# Patient Record
Sex: FEMALE | Race: WHITE | Employment: FULL TIME | URBAN - METROPOLITAN AREA
[De-identification: names, ages, dates, MRNs, and addresses within clinical notes are randomized per-mention and may not be internally consistent; named-entity substitution may affect disease eponyms.]

---

## 2018-02-19 ENCOUNTER — HOSPITAL ENCOUNTER (OUTPATIENT)
Dept: WOMENS IMAGING | Age: 43
Discharge: HOME | End: 2018-02-21
Payer: COMMERCIAL

## 2018-02-19 DIAGNOSIS — Z01.419 ENCOUNTER FOR ANNUAL ROUTINE GYNECOLOGICAL EXAMINATION: ICD-10-CM

## 2018-02-19 PROCEDURE — 77063 BREAST TOMOSYNTHESIS BI: CPT

## 2019-06-20 ENCOUNTER — HOSPITAL ENCOUNTER (OUTPATIENT)
Dept: WOMENS IMAGING | Age: 44
Discharge: HOME | End: 2019-06-22

## 2019-06-20 DIAGNOSIS — Z12.31 ENCOUNTER FOR SCREENING MAMMOGRAM FOR BREAST CANCER: ICD-10-CM

## 2019-06-20 PROCEDURE — 77067 SCR MAMMO BI INCL CAD: CPT

## 2020-08-12 ENCOUNTER — HOSPITAL ENCOUNTER (OUTPATIENT)
Dept: WOMENS IMAGING | Age: 45
Discharge: HOME | End: 2020-08-14
Payer: COMMERCIAL

## 2020-08-12 PROCEDURE — 77063 BREAST TOMOSYNTHESIS BI: CPT

## 2021-10-27 ENCOUNTER — HOSPITAL ENCOUNTER (OUTPATIENT)
Dept: WOMENS IMAGING | Age: 46
Discharge: HOME | End: 2021-10-29
Payer: COMMERCIAL

## 2021-10-27 DIAGNOSIS — Z12.31 ENCOUNTER FOR SCREENING MAMMOGRAM FOR BREAST CANCER: ICD-10-CM

## 2021-10-27 PROCEDURE — 77063 BREAST TOMOSYNTHESIS BI: CPT

## 2022-12-14 ENCOUNTER — HOSPITAL ENCOUNTER (OUTPATIENT)
Dept: WOMENS IMAGING | Age: 47
Discharge: HOME | End: 2022-12-16
Payer: COMMERCIAL

## 2022-12-14 DIAGNOSIS — Z12.31 ENCOUNTER FOR SCREENING MAMMOGRAM FOR MALIGNANT NEOPLASM OF BREAST: ICD-10-CM

## 2022-12-14 DIAGNOSIS — R92.2 BREAST DENSITY: ICD-10-CM

## 2022-12-14 PROCEDURE — 77063 BREAST TOMOSYNTHESIS BI: CPT

## 2023-01-16 PROBLEM — R79.89 LOW VITAMIN D LEVEL: Status: ACTIVE | Noted: 2023-01-16

## 2023-01-16 PROBLEM — M22.41 CHONDROMALACIA PATELLAE, RIGHT KNEE: Status: ACTIVE | Noted: 2022-05-19

## 2023-01-16 PROBLEM — R73.01 IMPAIRED FASTING GLUCOSE: Status: ACTIVE | Noted: 2023-01-16

## 2023-01-16 PROBLEM — E78.5 HYPERLIPIDEMIA: Status: ACTIVE | Noted: 2023-01-16

## 2023-01-17 ENCOUNTER — OFFICE VISIT (OUTPATIENT)
Dept: OBGYN CLINIC | Age: 48
End: 2023-01-17
Payer: COMMERCIAL

## 2023-01-17 VITALS
DIASTOLIC BLOOD PRESSURE: 82 MMHG | WEIGHT: 170 LBS | BODY MASS INDEX: 33.38 KG/M2 | HEIGHT: 60 IN | SYSTOLIC BLOOD PRESSURE: 118 MMHG

## 2023-01-17 DIAGNOSIS — Z01.419 ENCOUNTER FOR WELL WOMAN EXAM WITH ROUTINE GYNECOLOGICAL EXAM: Primary | ICD-10-CM

## 2023-01-17 DIAGNOSIS — Z12.11 COLON CANCER SCREENING: ICD-10-CM

## 2023-01-17 DIAGNOSIS — R10.9 ABDOMINAL WALL PAIN: ICD-10-CM

## 2023-01-17 PROCEDURE — 99396 PREV VISIT EST AGE 40-64: CPT | Performed by: OBSTETRICS & GYNECOLOGY

## 2023-01-17 RX ORDER — MULTIVIT-MIN/IRON/FOLIC ACID/K 18-600-40
CAPSULE ORAL
COMMUNITY

## 2023-01-17 ASSESSMENT — ENCOUNTER SYMPTOMS
APNEA: 0
BLOOD IN STOOL: 0
NAUSEA: 0
CONSTIPATION: 0
SHORTNESS OF BREATH: 0
DIARRHEA: 0
ABDOMINAL PAIN: 1

## 2023-01-17 NOTE — PROGRESS NOTES
Subjective:      Patient ID:  Stanislaw Cruz is a 50 y.o. female with chief complaint of:  Chief Complaint   Patient presents with    Annual Exam     Pt also having pelvic pain that starts in the middle and feels somewhat muscular. Pt had hyst in 2016. Pt states this pelvic pain has gotten worse over the last 8 months        Patient is s/p hysterectomy however presents from annual she still has ovaries. She is complaining of lower abdominal pain the was worsening since spring. She states it is aggravating at 4-5/10 with occasional worsening. She notices mostly when she stands sits up from lying but also after she has been lying down for hours. It runs across entire abdomen does not radiate down or to back. She denies any pain with intercourse denies ovulation pain. No past medical history on file. Past Surgical History:   Procedure Laterality Date    HYSTERECTOMY VAGINAL N/A 10/3/2016    [facility]  performed by Perfecto Yo DO at [address] History   Problem Relation Age of Onset    Cancer Paternal Aunt      Current Outpatient Medications on File Prior to Visit   Medication Sig Dispense Refill    Cholecalciferol (VITAMIN D) 50 MCG (2000 UT) CAPS capsule Take by mouth      escitalopram (LEXAPRO) 20 MG tablet Take by mouth      buPROPion (WELLBUTRIN XL) 150 MG extended release tablet  (Patient not taking: Reported on 1/17/2023)      ibuprofen (ADVIL;MOTRIN) 800 MG tablet  (Patient not taking: Reported on 1/17/2023)       No current facility-administered medications on file prior to visit. Allergies:  Patient has no known allergies. Review of Systems   Constitutional:  Positive for unexpected weight change. Negative for fatigue and fever. Respiratory:  Negative for apnea and shortness of breath. Cardiovascular:  Negative for chest pain and palpitations. Gastrointestinal:  Positive for abdominal pain. Negative for blood in stool, constipation, diarrhea and nausea.    Genitourinary: Negative for difficulty urinating, dyspareunia, dysuria, flank pain, hematuria, vaginal bleeding and vaginal discharge. Neurological:  Negative for dizziness, weakness and light-headedness. Psychiatric/Behavioral:  Negative for agitation and dysphoric mood. Objective:   /82   Ht 5' (1.524 m)   Wt 170 lb (77.1 kg)   LMP 09/29/2016 (Exact Date)   BMI 33.20 kg/m²      Physical Exam  Constitutional:       General: She is not in acute distress. Appearance: She is well-developed. She is not diaphoretic. HENT:      Head: Normocephalic. Right Ear: External ear normal.      Left Ear: External ear normal.      Nose: Nose normal.   Eyes:      Conjunctiva/sclera: Conjunctivae normal.      Pupils: Pupils are equal, round, and reactive to light. Neck:      Thyroid: No thyromegaly. Trachea: No tracheal deviation. Cardiovascular:      Rate and Rhythm: Normal rate and regular rhythm. Heart sounds: Normal heart sounds. No murmur heard. No friction rub. No gallop. Pulmonary:      Effort: Pulmonary effort is normal. No respiratory distress. Breath sounds: Normal breath sounds. No wheezing or rales. Chest:      Chest wall: No tenderness. Breasts:     Right: No mass, nipple discharge, skin change or tenderness. Left: No mass, nipple discharge, skin change or tenderness. Abdominal:      General: Bowel sounds are normal. There is no distension. Palpations: Abdomen is soft. There is no mass. Tenderness: There is abdominal tenderness (in particularly midline but running across area of patient's pannus. ). There is no guarding or rebound. Hernia: No hernia is present. Genitourinary:     Labia:         Right: No rash, tenderness or lesion. Left: No rash, tenderness or lesion. Vagina: Normal.      Adnexa:         Right: No mass, tenderness or fullness. Left: No mass, tenderness or fullness.         Comments: Vagina; cuff well supported  Lymphadenopathy:      Upper Body:      Right upper body: No supraclavicular or axillary adenopathy. Left upper body: No supraclavicular or axillary adenopathy. Skin:     General: Skin is warm and dry. Neurological:      Mental Status: She is alert and oriented to person, place, and time. Cranial Nerves: No cranial nerve deficit. Deep Tendon Reflexes: Reflexes normal.   Psychiatric:         Mood and Affect: Mood normal.         Behavior: Behavior normal.         Judgment: Judgment normal.       Assessment:       Diagnosis Orders   1. Encounter for well woman exam with routine gynecological exam        2. Abdominal wall pain  CT ABDOMEN PELVIS W WO CONTRAST Additional Contrast? Oral      3. Colon cancer screening  Tana Moore MD, Gastroenterology, [facility]            Plan:     Patient with pelvic US in office by dr Ronna Faith in 2018 for similar pain. No concern  noted. There feels like a palpable diathesis rectus but no hernia of fascia. Will have pt use abdominal support with faja to see if this supports help. Will obtain CT to rule out any other changes   Orders Placed This Encounter   Procedures    CT ABDOMEN PELVIS W WO CONTRAST Additional Contrast? Oral     Standing Status:   Future     Standing Expiration Date:   1/17/2024     Order Specific Question:   Additional Contrast?     Answer:   Oral     Order Specific Question:   STAT Creatinine as needed:     Answer:   Yes    Tana Moore MD, [facility]     Referral Priority:   Routine     Referral Type:   Eval and Treat     Referral Reason:   Specialty Services Required     Referred to Provider:   Cesar Solis MD     Requested Specialty:   Gastroenterology     Number of Visits Requested:   1     No orders of the defined types were placed in this encounter. No follow-ups on file.      Avinash Zhang DO

## 2023-01-19 ENCOUNTER — TELEPHONE (OUTPATIENT)
Dept: OBGYN CLINIC | Age: 48
End: 2023-01-19

## 2023-01-19 NOTE — TELEPHONE ENCOUNTER
Pt was given a CT scan order with oral contrast.  Pt cannot do the oral contrast and asking if order can be changed to IV contrast.

## 2023-02-15 ENCOUNTER — HOSPITAL ENCOUNTER (OUTPATIENT)
Dept: CT IMAGING | Age: 48
Discharge: HOME | End: 2023-02-17
Payer: COMMERCIAL

## 2023-02-15 DIAGNOSIS — R10.9 ABDOMINAL WALL PAIN: ICD-10-CM

## 2023-02-15 PROCEDURE — 6360000004 HC RX CONTRAST MEDICATION: Performed by: OBSTETRICS & GYNECOLOGY

## 2023-02-15 PROCEDURE — 74177 CT ABD & PELVIS W/CONTRAST: CPT

## 2023-02-15 RX ADMIN — IOPAMIDOL 50 ML: 612 INJECTION, SOLUTION INTRAVENOUS at 14:00

## 2023-04-07 ENCOUNTER — HOSPITAL ENCOUNTER (OUTPATIENT)
Age: 48
Setting detail: OUTPATIENT SURGERY
Discharge: HOME | End: 2023-04-07
Attending: INTERNAL MEDICINE | Admitting: INTERNAL MEDICINE
Payer: COMMERCIAL

## 2023-04-07 ENCOUNTER — ANESTHESIA (OUTPATIENT)
Dept: ENDOSCOPY | Age: 48
End: 2023-04-07
Payer: COMMERCIAL

## 2023-04-07 ENCOUNTER — ANESTHESIA EVENT (OUTPATIENT)
Dept: ENDOSCOPY | Age: 48
End: 2023-04-07
Payer: COMMERCIAL

## 2023-04-07 VITALS
HEIGHT: 60 IN | RESPIRATION RATE: 16 BRPM | BODY MASS INDEX: 33.38 KG/M2 | HEART RATE: 63 BPM | OXYGEN SATURATION: 97 % | TEMPERATURE: 97.9 F | WEIGHT: 170 LBS | SYSTOLIC BLOOD PRESSURE: 141 MMHG | DIASTOLIC BLOOD PRESSURE: 79 MMHG

## 2023-04-07 DIAGNOSIS — Z12.11 SCREENING FOR COLON CANCER: ICD-10-CM

## 2023-04-07 PROCEDURE — 6370000000 HC RX 637 (ALT 250 FOR IP): Performed by: INTERNAL MEDICINE

## 2023-04-07 PROCEDURE — 2580000003 HC RX 258: Performed by: INTERNAL MEDICINE

## 2023-04-07 PROCEDURE — 7100000010 HC PHASE II RECOVERY - FIRST 15 MIN: Performed by: INTERNAL MEDICINE

## 2023-04-07 PROCEDURE — 2580000003 HC RX 258

## 2023-04-07 PROCEDURE — 6360000002 HC RX W HCPCS: Performed by: NURSE ANESTHETIST, CERTIFIED REGISTERED

## 2023-04-07 PROCEDURE — 3700000001 HC ADD 15 MINUTES (ANESTHESIA): Performed by: INTERNAL MEDICINE

## 2023-04-07 PROCEDURE — 2500000003 HC RX 250 WO HCPCS: Performed by: NURSE ANESTHETIST, CERTIFIED REGISTERED

## 2023-04-07 PROCEDURE — 3609027000 HC COLONOSCOPY: Performed by: INTERNAL MEDICINE

## 2023-04-07 PROCEDURE — 3700000000 HC ANESTHESIA ATTENDED CARE: Performed by: INTERNAL MEDICINE

## 2023-04-07 PROCEDURE — 7100000011 HC PHASE II RECOVERY - ADDTL 15 MIN: Performed by: INTERNAL MEDICINE

## 2023-04-07 PROCEDURE — 2709999900 HC NON-CHARGEABLE SUPPLY: Performed by: INTERNAL MEDICINE

## 2023-04-07 RX ORDER — SODIUM CHLORIDE 9 MG/ML
INJECTION, SOLUTION INTRAVENOUS CONTINUOUS
Status: DISCONTINUED | OUTPATIENT
Start: 2023-04-07 | End: 2023-04-07 | Stop reason: HOSPADM

## 2023-04-07 RX ORDER — SODIUM CHLORIDE 9 MG/ML
INJECTION, SOLUTION INTRAVENOUS
Status: COMPLETED
Start: 2023-04-07 | End: 2023-04-07

## 2023-04-07 RX ORDER — ASCORBIC ACID 500 MG
500 TABLET ORAL DAILY
COMMUNITY

## 2023-04-07 RX ORDER — LIDOCAINE HYDROCHLORIDE 20 MG/ML
INJECTION, SOLUTION INFILTRATION; PERINEURAL PRN
Status: DISCONTINUED | OUTPATIENT
Start: 2023-04-07 | End: 2023-04-07 | Stop reason: SDUPTHER

## 2023-04-07 RX ORDER — SIMETHICONE 20 MG/.3ML
EMULSION ORAL PRN
Status: DISCONTINUED | OUTPATIENT
Start: 2023-04-07 | End: 2023-04-07 | Stop reason: ALTCHOICE

## 2023-04-07 RX ORDER — MAGNESIUM HYDROXIDE 1200 MG/15ML
LIQUID ORAL PRN
Status: DISCONTINUED | OUTPATIENT
Start: 2023-04-07 | End: 2023-04-07 | Stop reason: ALTCHOICE

## 2023-04-07 RX ORDER — PROPOFOL 10 MG/ML
INJECTION, EMULSION INTRAVENOUS PRN
Status: DISCONTINUED | OUTPATIENT
Start: 2023-04-07 | End: 2023-04-07 | Stop reason: SDUPTHER

## 2023-04-07 RX ADMIN — SODIUM CHLORIDE 500 ML: 9 INJECTION, SOLUTION INTRAVENOUS at 08:00

## 2023-04-07 RX ADMIN — PROPOFOL 50 MG: 10 INJECTION, EMULSION INTRAVENOUS at 08:29

## 2023-04-07 RX ADMIN — LIDOCAINE HYDROCHLORIDE 40 MG: 20 INJECTION, SOLUTION INFILTRATION; PERINEURAL at 08:19

## 2023-04-07 RX ADMIN — PROPOFOL 50 MG: 10 INJECTION, EMULSION INTRAVENOUS at 08:22

## 2023-04-07 RX ADMIN — PROPOFOL 100 MG: 10 INJECTION, EMULSION INTRAVENOUS at 08:20

## 2023-04-07 ASSESSMENT — PAIN DESCRIPTION - LOCATION: LOCATION: ABDOMEN

## 2023-04-07 ASSESSMENT — PAIN - FUNCTIONAL ASSESSMENT: PAIN_FUNCTIONAL_ASSESSMENT: NONE - DENIES PAIN

## 2023-04-07 ASSESSMENT — PAIN SCALES - GENERAL: PAINLEVEL_OUTOF10: 1

## 2023-04-07 NOTE — H&P
Patient Name: Christy Yoo  : 1975  MRN: [id number]  DATE: 23      ENDOSCOPY  History and Physical    Procedure:    [] Diagnostic Colonoscopy       [x] Screening Colonoscopy  [] EGD      [] ERCP      [] EUS       [] Other    [x] Previous office notes/History and Physical reviewed from the patients chart. Please see EMR for further details of HPI. I have examined the patient's status immediately prior to the procedure and:      Indications/HPI:    []Abdominal Pain   []Cancer- GI/Lung  []Fhx of colon CA  []History of Polyps   []Hodges   []Melena  []Abnormal Imaging   []Dysphagia    []Persistent Pneumonia  []Anemia   []Food Impaction  []History of Polyps  []GI Bleed   []Pulmonary nodule/Mass  []Change in bowel habits  []Heartburn/Reflux  []Rectal Bleed (BRBPR)  []Chest Pain - Non Cardiac  []Heme (+) Stool  []Ulcers  []Constipation   []Hemoptysis   []Varices  []Diarrhea   []Hypoxemia  []Nausea/Vomiting   [x]Screening   []Crohns/Colitis  []Other:    Anesthesia:   [x] MAC [] Moderate Sedation   [] General   [] None     ROS: 12 pt Review of Symptoms was negative unless mentioned above    Medications:   Prior to Admission medications    Medication Sig Start Date End Date Taking? Authorizing Provider   vitamin C (ASCORBIC ACID) 500 MG tablet Take 1 tablet by mouth daily   Yes Historical Provider, MD   Cholecalciferol (VITAMIN D) 50 MCG (2000) CAPS capsule Take by mouth    Historical Provider, MD   escitalopram (LEXAPRO) 20 MG tablet Take by mouth 19   Historical Provider, MD   buPROPion (WELLBUTRIN XL) 150 MG extended release tablet  18   Historical Provider, MD   ibuprofen (ADVIL;MOTRIN) 800 MG tablet  18   Historical Provider, MD     Allergies: No Known Allergies   History of allergic reaction to anesthesia:  No  Past Medical History:  History reviewed. No pertinent past medical history.   Past Surgical History:  Past Surgical History:   Procedure Laterality Date    HYSTERECTOMY VAGINAL N/A

## 2023-04-07 NOTE — ANESTHESIA POSTPROCEDURE EVALUATION
Department of Anesthesiology  Postprocedure Note    Patient: Sean Lynn  MRN: [id number]  YOB: 1975  Date of evaluation: 4/7/2023      Procedure Summary     Date: 04/07/23 Room / Location: [address] [place] [address]    Anesthesia Start: 2477 Anesthesia Stop: 3069    Procedure: Colonoscopy w/ polypectomies Diagnosis:       Screening for colon cancer      (Screening for colon cancer [Z12.11])    Surgeons: Toyin Henson MD Responsible Provider: CIERA Dyson CRNA    Anesthesia Type: MAC ASA Status: 2          Anesthesia Type: No value filed.     Cornelio Phase I: Cornelio Score: 10    Cornelio Phase II:        Anesthesia Post Evaluation    Patient location during evaluation: bedside  Patient participation: complete - patient participated  Level of consciousness: awake  Airway patency: patent  Nausea & Vomiting: no nausea and no vomiting  Complications: no  Cardiovascular status: blood pressure returned to baseline  Respiratory status: acceptable  Hydration status: euvolemic

## 2023-04-07 NOTE — ANESTHESIA PRE PROCEDURE
Answered      Vital Signs (Current): There were no vitals filed for this visit. BP Readings from Last 3 Encounters:   01/17/23 118/82   01/26/18 122/80   10/03/16 120/65       NPO Status:                                                                                 BMI:   Wt Readings from Last 3 Encounters:   01/17/23 170 lb (77.1 kg)   01/26/18 151 lb (68.5 kg)   10/03/16 140 lb 6.9 oz (63.7 kg)     There is no height or weight on file to calculate BMI.    CBC:   Lab Results   Component Value Date/Time    WBC 14.7 10/03/2016 11:39 AM    RBC 4.32 10/03/2016 11:39 AM    HGB 11.9 10/03/2016 11:39 AM    HCT 34.7 10/03/2016 11:39 AM    MCV 80.4 10/03/2016 11:39 AM    RDW 13.7 10/03/2016 11:39 AM     10/03/2016 11:39 AM       CMP: No results found for: NA, K, CL, CO2, BUN, CREATININE, GFRAA, AGRATIO, LABGLOM, GLUCOSE, GLU, PROT, CALCIUM, BILITOT, ALKPHOS, AST, ALT    POC Tests: No results for input(s): POCGLU, POCNA, POCK, POCCL, POCBUN, POCHEMO, POCHCT in the last 72 hours.     Coags:   Lab Results   Component Value Date/Time    APTT 24.7 10/03/2016 11:39 AM       HCG (If Applicable): No results found for: PREGTESTUR, PREGSERUM, HCG, HCGQUANT     ABGs: No results found for: PHART, PO2ART, LKJ8HMP, XRB0TCA, BEART, V8OQRSLS     Type & Screen (If Applicable):  No results found for: LABABO, LABRH    Drug/Infectious Status (If Applicable):  No results found for: HIV, HEPCAB    COVID-19 Screening (If Applicable): No results found for: COVID19        Anesthesia Evaluation  Patient summary reviewed and Nursing notes reviewed  Airway: Mallampati: II  TM distance: >3 FB   Neck ROM: full  Mouth opening: > = 3 FB   Dental:          Pulmonary:Negative Pulmonary ROS and normal exam                               Cardiovascular:Negative CV ROS                      Neuro/Psych:   Negative Neuro/Psych ROS              GI/Hepatic/Renal:   (+) bowel prep,           Endo/Other: Negative

## 2023-05-04 PROBLEM — L30.9 DERMATITIS: Status: ACTIVE | Noted: 2023-05-04

## 2023-05-04 PROBLEM — R92.2 DENSE BREAST: Status: ACTIVE | Noted: 2023-05-04

## 2023-05-04 PROBLEM — E55.9 VITAMIN D DEFICIENCY: Status: ACTIVE | Noted: 2023-05-04

## 2023-05-04 PROBLEM — R05.3 CHRONIC COUGH: Status: ACTIVE | Noted: 2023-05-04

## 2023-05-04 PROBLEM — E78.6 LOW HDL (UNDER 40): Status: ACTIVE | Noted: 2023-05-04

## 2023-05-04 PROBLEM — F32.A DEPRESSION: Status: ACTIVE | Noted: 2023-05-04

## 2023-05-04 PROBLEM — R73.01 IFG (IMPAIRED FASTING GLUCOSE): Status: ACTIVE | Noted: 2023-05-04

## 2023-05-04 PROBLEM — M25.561 PAIN IN BOTH KNEES: Status: ACTIVE | Noted: 2023-05-04

## 2023-05-04 PROBLEM — R03.0 BLOOD PRESSURE ELEVATED WITHOUT HISTORY OF HTN: Status: ACTIVE | Noted: 2023-05-04

## 2023-05-04 PROBLEM — E78.5 HYPERLIPIDEMIA: Status: ACTIVE | Noted: 2023-05-04

## 2023-05-04 PROBLEM — R92.30 DENSE BREAST: Status: ACTIVE | Noted: 2023-05-04

## 2023-05-04 PROBLEM — M25.562 PAIN IN BOTH KNEES: Status: ACTIVE | Noted: 2023-05-04

## 2023-05-09 LAB
ANION GAP IN SER/PLAS: 13 MMOL/L (ref 10–20)
CALCIDIOL (25 OH VITAMIN D3) (NG/ML) IN SER/PLAS: 61 NG/ML
CALCIUM (MG/DL) IN SER/PLAS: 8.9 MG/DL (ref 8.6–10.3)
CARBON DIOXIDE, TOTAL (MMOL/L) IN SER/PLAS: 25 MMOL/L (ref 21–32)
CHLORIDE (MMOL/L) IN SER/PLAS: 106 MMOL/L (ref 98–107)
CHOLESTEROL (MG/DL) IN SER/PLAS: 214 MG/DL (ref 0–199)
CHOLESTEROL IN HDL (MG/DL) IN SER/PLAS: 32.9 MG/DL
CHOLESTEROL/HDL RATIO: 6.5
CREATININE (MG/DL) IN SER/PLAS: 0.9 MG/DL (ref 0.5–1.05)
GFR FEMALE: 78 ML/MIN/1.73M2
GLUCOSE (MG/DL) IN SER/PLAS: 87 MG/DL (ref 74–99)
LDL: 123 MG/DL (ref 0–99)
NON HDL CHOLESTEROL: 181 MG/DL
POTASSIUM (MMOL/L) IN SER/PLAS: 4.6 MMOL/L (ref 3.5–5.3)
SODIUM (MMOL/L) IN SER/PLAS: 139 MMOL/L (ref 136–145)
TRIGLYCERIDE (MG/DL) IN SER/PLAS: 289 MG/DL (ref 0–149)
UREA NITROGEN (MG/DL) IN SER/PLAS: 8 MG/DL (ref 6–23)
VLDL: 58 MG/DL (ref 0–40)

## 2023-05-10 LAB
ESTIMATED AVERAGE GLUCOSE FOR HBA1C: 111 MG/DL
HEMOGLOBIN A1C/HEMOGLOBIN TOTAL IN BLOOD: 5.5 %

## 2023-05-11 ENCOUNTER — OFFICE VISIT (OUTPATIENT)
Dept: PRIMARY CARE | Facility: CLINIC | Age: 48
End: 2023-05-11
Payer: COMMERCIAL

## 2023-05-11 VITALS
HEIGHT: 60 IN | SYSTOLIC BLOOD PRESSURE: 138 MMHG | WEIGHT: 163.7 LBS | TEMPERATURE: 97.9 F | OXYGEN SATURATION: 97 % | HEART RATE: 51 BPM | BODY MASS INDEX: 32.14 KG/M2 | DIASTOLIC BLOOD PRESSURE: 82 MMHG

## 2023-05-11 DIAGNOSIS — F33.9 RECURRENT MAJOR DEPRESSIVE DISORDER, REMISSION STATUS UNSPECIFIED (CMS-HCC): Primary | ICD-10-CM

## 2023-05-11 DIAGNOSIS — E66.9 CLASS 1 OBESITY WITHOUT SERIOUS COMORBIDITY WITH BODY MASS INDEX (BMI) OF 31.0 TO 31.9 IN ADULT, UNSPECIFIED OBESITY TYPE: ICD-10-CM

## 2023-05-11 DIAGNOSIS — R79.89 LOW VITAMIN D LEVEL: ICD-10-CM

## 2023-05-11 DIAGNOSIS — E78.2 MIXED HYPERLIPIDEMIA: ICD-10-CM

## 2023-05-11 DIAGNOSIS — R03.0 BLOOD PRESSURE ELEVATED WITHOUT HISTORY OF HTN: ICD-10-CM

## 2023-05-11 DIAGNOSIS — R73.01 IFG (IMPAIRED FASTING GLUCOSE): ICD-10-CM

## 2023-05-11 PROBLEM — M22.41 CHONDROMALACIA PATELLAE, RIGHT KNEE: Status: ACTIVE | Noted: 2022-05-19

## 2023-05-11 PROBLEM — E66.811 CLASS 1 OBESITY WITHOUT SERIOUS COMORBIDITY WITH BODY MASS INDEX (BMI) OF 31.0 TO 31.9 IN ADULT: Status: ACTIVE | Noted: 2023-05-11

## 2023-05-11 PROBLEM — K76.0 FATTY LIVER: Status: ACTIVE | Noted: 2023-05-11

## 2023-05-11 PROCEDURE — 3008F BODY MASS INDEX DOCD: CPT | Performed by: FAMILY MEDICINE

## 2023-05-11 PROCEDURE — 1036F TOBACCO NON-USER: CPT | Performed by: FAMILY MEDICINE

## 2023-05-11 PROCEDURE — 99213 OFFICE O/P EST LOW 20 MIN: CPT | Performed by: FAMILY MEDICINE

## 2023-05-11 RX ORDER — ACETAMINOPHEN 500 MG
50 TABLET ORAL DAILY
COMMUNITY

## 2023-05-11 RX ORDER — ASCORBIC ACID 500 MG
1 TABLET,CHEWABLE ORAL DAILY
COMMUNITY

## 2023-05-11 RX ORDER — MELOXICAM 15 MG/1
15 TABLET ORAL DAILY PRN
COMMUNITY
Start: 2022-05-19

## 2023-05-11 RX ORDER — ESCITALOPRAM OXALATE 20 MG/1
20 TABLET ORAL DAILY
COMMUNITY
End: 2023-05-11 | Stop reason: SDUPTHER

## 2023-05-11 RX ORDER — ESCITALOPRAM OXALATE 20 MG/1
20 TABLET ORAL DAILY
Qty: 90 TABLET | Refills: 1 | Status: SHIPPED | OUTPATIENT
Start: 2023-05-11 | End: 2023-11-16 | Stop reason: SDUPTHER

## 2023-05-11 RX ORDER — CITALOPRAM 10 MG/1
10 TABLET ORAL
COMMUNITY
End: 2023-06-01 | Stop reason: WASHOUT

## 2023-05-11 ASSESSMENT — PATIENT HEALTH QUESTIONNAIRE - PHQ9
2. FEELING DOWN, DEPRESSED OR HOPELESS: NOT AT ALL
1. LITTLE INTEREST OR PLEASURE IN DOING THINGS: NOT AT ALL
SUM OF ALL RESPONSES TO PHQ9 QUESTIONS 1 AND 2: 0

## 2023-05-11 NOTE — PATIENT INSTRUCTIONS
Continue the current medications. Follow up in 6 months.    It was a pleasure to see you today. Thank you for choosing us for your health care needs.    If you have lab or other testing completed and have not been informed of results within one week, please call the office for your results.    If you haven't done so, consider signing up for [employer], the [facility] personal health record. Ask the staff how you can get started.

## 2023-05-11 NOTE — PROGRESS NOTES
Subjective   Patient ID: Connie Paige is a 48 y.o. female who presents for 6 month follow up for monitoring and management of multiple medical conditions.      Pt has depression.   She is treated with Escitalopram.   Sxs are stable on current dosage.   Denies suicidal ideation.      Pt has vitamin D deficiency.   She is compliant with taking her OTC supplement.      She has impaired fasting glucose based on labs.  Patient denies any polyuria, polydipsia, polyphagia.     She has hyperlipidemia.  Has been treated conservatively with dietary changes, exercise, and maintenance of healthy weight.       Review of Systems  Constitutional: Patient denies any fever, chills, loss of appetite, or unexplained weight loss.  HEENT: Denies any headache, sore throat, eye pain, ear pain, decreased vision, or decreased hearing. Patient also denies any rhinorrhea.  Cardiovascular: Patient denies any chest pain, shortness of breath with exertion, tachycardia, palpitations, orthopnea, or paroxysmal nocturnal dyspnea.  Respiratory: Patient denies any cough, shortness breath, or wheezing.  Gastrointestinal patient denies any nausea, vomiting, diarrhea, constipation, melena, hematochezia, or reflux symptoms.  Skin: Denies any rashes or skin lesions   Neurology: Patient denies any new motor or sensory losses. Denies any numbness, tingling, weakness, and incoordination of the extremities. Patient also denies any tremor, seizures, or gait instability.  Endocrinology: Denies any polyuria, polydipsia, polyphagia, or heat/cold intolerance.  Psychiatric: Denies any anxiety, depression, or suicidal/homicidal ideation.  Hematology: Patient denies any abnormal bruising or bleeding.     Objective   /82   Pulse 51   Temp 36.6 °C (97.9 °F)   Ht 1.524 m (5')   Wt 74.3 kg (163 lb 11.2 oz)   SpO2 97%   BMI 31.97 kg/m²     Physical Exam  General Appearance: Alert and cooperative, in no acute distress, well-developed/well-nourished obese  female.     Neck: Supple and without adenopathy or rigidity. There is no JVD at 90° and no carotid bruits are noted. There is no thyromegaly, thyroid tenderness, or palpable thyroid nodules.  Heart: Regular rate and rhythm without murmur or ectopy.  Lungs: Clear to auscultation bilaterally with good air exchange.  Skin: Good turgor, moist, warm and without rashes or lesions.    Abdomen: Soft and nondistended. Bowel sounds are normal. There is tenderness to palpation in the suprapubic region. No mass, guarding, rebound, or rigidity.     Neurological exam: Alert and oriented x3, no tremor, normal gait.  Extremities: No clubbing, cyanosis, or edema.      MSK: No joint effusion or deformity noted of the bilateral knee.     Assessment/Plan   Depression: Stable based on symptoms.  We will continue the current dose of Lexapro.    Elevated BP w/out dx of HTN:   Blood pressure appears somewhat improved in office and has been slightly elevated at past office visits. We will continue to monitor and will recheck in 6 months.   Dietary changes, exercise, and maintenance of healthy weight were recommended.    IFG: Stable. Most recent A1c was 5.5% on 5/9/23 labs (up from 4.9% on 4/25/22 labs).  We will continue to monitor.    Hyperlipidemia: Stable based on last labs.  Dietary changes, exercise, and maintenance of a healthy weight were discussed at length.  Recommend regular exercise and a diet high in omega 3 fatty acids.  Recommend taking EPA fish oil.  5/10/2023: ASCVD risk calculated at: 3.2%    Vit D deficiency: Stable on last labs.   Pt is currently taking 4000 units daily.    Obesity: Dietary changes, exercise, and maintenance of a healthy weight were discussed at length.           MAMMOGRAM DUE 12/15/2023  COLONOSCOPY DUE 4/7/2028    By signing my name below, I, Cathy Kumar, attest that this documentation has been prepared under the direction and in the presence of Dr. Leary.  All medical record entries  made by the Scribe were at my direction and personally dictated by me. I have reviewed the chart and agree that the record accurately reflects my personal performance of the history, physical exam, discussion and plan. (Dr. Leary).

## 2023-05-11 NOTE — PROGRESS NOTES
Subjective   Patient ID: Connie Paige is a 48 y.o. female who presents for 6 month follow up for monitoring and management of multiple medical conditions.      HPI     NO NEW CONCERNS    Pt has depression.   She is treated with Escitalopram.   Sxs are stable on current dosage.   Denies suicidal ideation.      Pt has vitamin D deficiency.   She is compliant with taking her OTC supplement.      She has impaired fasting glucose based on labs.  Patient denies any polyuria, polydipsia, polyphagia.     She has hyperlipidemia.  Has been treated conservatively with dietary changes, exercise, and maintenance of healthy weight.            Review of Systems    Objective   /89   Pulse 51   Temp 36.6 °C (97.9 °F)   Ht 1.524 m (5')   Wt 74.3 kg (163 lb 11.2 oz)   SpO2 97%   BMI 31.97 kg/m²     Physical Exam    Assessment/Plan         Depression: Stable based on symptoms.  We will continue the current dose of Lexapro.    Elevated BP w/out dx of HTN:   11/10/2022: Blood pressure appears somewhat improved in office and has been slightly elevated at past office visits. We will continue to monitor and will recheck in 6 months.   Dietary changes, exercise, and maintenance of healthy weight were recommended.    IFG: Stable. Her last A1c was 5.5%.  We will continue to monitor.    Hyperlipidemia: Stable based on last labs.  Dietary changes, exercise, and maintenance of a healthy weight were discussed at length.  Recommend regular exercise and a diet high in omega 3 fatty acids.  Recommend taking EPA fish oil.  5/10/2023: ASCVD risk calculated at: 3.2%    Vit D deficiency: Stable on last labs.   Pt is currently taking 4000 units daily.      Obesity: Dietary changes, exercise, and maintenance of a healthy weight were discussed at length.         MAMMOGRAM DUE 12/15/2023

## 2023-11-16 ENCOUNTER — OFFICE VISIT (OUTPATIENT)
Dept: PRIMARY CARE | Facility: CLINIC | Age: 48
End: 2023-11-16
Payer: COMMERCIAL

## 2023-11-16 VITALS
HEIGHT: 60 IN | OXYGEN SATURATION: 98 % | BODY MASS INDEX: 31.79 KG/M2 | WEIGHT: 161.9 LBS | SYSTOLIC BLOOD PRESSURE: 138 MMHG | DIASTOLIC BLOOD PRESSURE: 84 MMHG | TEMPERATURE: 97.7 F | HEART RATE: 57 BPM

## 2023-11-16 DIAGNOSIS — Z12.31 ENCOUNTER FOR SCREENING MAMMOGRAM FOR MALIGNANT NEOPLASM OF BREAST: ICD-10-CM

## 2023-11-16 DIAGNOSIS — F33.9 RECURRENT MAJOR DEPRESSIVE DISORDER, REMISSION STATUS UNSPECIFIED (CMS-HCC): Primary | ICD-10-CM

## 2023-11-16 DIAGNOSIS — E66.9 CLASS 1 OBESITY WITHOUT SERIOUS COMORBIDITY WITH BODY MASS INDEX (BMI) OF 31.0 TO 31.9 IN ADULT, UNSPECIFIED OBESITY TYPE: ICD-10-CM

## 2023-11-16 DIAGNOSIS — E78.2 MIXED HYPERLIPIDEMIA: ICD-10-CM

## 2023-11-16 DIAGNOSIS — R03.0 BLOOD PRESSURE ELEVATED WITHOUT HISTORY OF HTN: ICD-10-CM

## 2023-11-16 DIAGNOSIS — R79.89 LOW VITAMIN D LEVEL: ICD-10-CM

## 2023-11-16 DIAGNOSIS — R73.01 IFG (IMPAIRED FASTING GLUCOSE): ICD-10-CM

## 2023-11-16 PROCEDURE — 1036F TOBACCO NON-USER: CPT | Performed by: FAMILY MEDICINE

## 2023-11-16 PROCEDURE — 99214 OFFICE O/P EST MOD 30 MIN: CPT | Performed by: FAMILY MEDICINE

## 2023-11-16 PROCEDURE — 3008F BODY MASS INDEX DOCD: CPT | Performed by: FAMILY MEDICINE

## 2023-11-16 RX ORDER — ESCITALOPRAM OXALATE 20 MG/1
20 TABLET ORAL DAILY
Qty: 90 TABLET | Refills: 1 | Status: SHIPPED | OUTPATIENT
Start: 2023-11-16 | End: 2024-05-23 | Stop reason: SDUPTHER

## 2023-11-16 ASSESSMENT — PATIENT HEALTH QUESTIONNAIRE - PHQ9
1. LITTLE INTEREST OR PLEASURE IN DOING THINGS: NOT AT ALL
2. FEELING DOWN, DEPRESSED OR HOPELESS: NOT AT ALL
SUM OF ALL RESPONSES TO PHQ9 QUESTIONS 1 AND 2: 0

## 2023-11-16 NOTE — PATIENT INSTRUCTIONS
Follow up in 6 months with labs to be done PRIOR.    It was a pleasure to see you today. Thank you for choosing us for your health care needs.    If you have lab or other testing completed and have not been informed of results within one week, please call the office for your results.    If you haven't done so, consider signing up for [facility] [email or web address], the [facility] personal health record. Ask the staff how you can get started.

## 2023-11-16 NOTE — PROGRESS NOTES
Subjective   Patient ID: Connie Paige is a 48 y.o. female who presents for Follow-up.    HPI     No new concern   No recent BW  Mammo; 2022      Pt has depression.   Sxs are stable on current medication.   Denies suicidal ideation.      Pt has vitamin D deficiency.   She is compliant with her OTC supplement.      She has impaired fasting glucose based on labs.  Patient denies any polyuria, polydipsia, polyphagia.     She has hyperlipidemia.  Has been treated conservatively with dietary changes, exercise, and maintenance of healthy weight.    Declined flu vaccine       Review of Systems  Constitutional: Patient denies any fever, chills, loss of appetite, or unexplained weight loss.  Cardiovascular: Patient denies any chest pain, shortness of breath with exertion, tachycardia, palpitations, orthopnea, or paroxysmal nocturnal dyspnea.  Respiratory: Patient denies any cough, shortness breath, or wheezing.  Skin: Denies any rashes or skin lesions.   Neurology: Patient denies any new motor or sensory losses.  Denies any numbness, tingling, weakness, and incoordination of the extremities.  Patient also denies any tremor, seizures, or gait instability.  Endocrinology: Denies any polyuria, polydipsia, polyphagia, or heat/cold intolerance.    SEE HPI ALSO    Objective   /84   Pulse 57   Temp 36.5 °C (97.7 °F)   Ht 1.524 m (5')   Wt 73.4 kg (161 lb 14.4 oz)   SpO2 98%   BMI 31.62 kg/m²     Physical Exam  General Appearance: Alert and cooperative, in no acute distress, well-developed/well-nourished.  Neck: Supple and without adenopathy or rigidity.  There is no JVD at 90° and no carotid bruits are noted.  There is no thyromegaly, thyroid tenderness, or palpable thyroid nodules.  Heart: Regular rate and rhythm without murmur or ectopy.  Respiratory: Lungs are clear to auscultation bilaterally with good air exchange.  Good respiratory effort and no accessory muscle use.  Skin: Good turgor, moist, warm and without  rashes or lesions.  Neurological exam: Alert and oriented ×3, no tremor, normal gait.  Extremities: No clubbing, cyanosis, or edema  Psychiatric: Appropriate mood and affect, good insight and judgment, no delusions or thought disorders, no suicidal or homicidal ideation    Assessment/Plan   1. Recurrent major depressive disorder, remission status unspecified (CMS/HCC)  Stable.  We will continue the current medication without change.  - escitalopram (Lexapro) 20 mg tablet; Take 1 tablet (20 mg) by mouth once daily.  Dispense: 90 tablet; Refill: 1    2. Blood pressure elevated without history of HTN  Blood pressure not in ideal range.  Recommended a low-salt diet and maintenance of a healthy weight.  We will continue to monitor.    3. IFG (impaired fasting glucose)  Dietary changes, exercise, and maintenance of a healthy weight were discussed at length.  - Basic Metabolic Panel; Future  - Hemoglobin A1C; Future    4. Mixed hyperlipidemia  Stable based on labs.  Will continue with conservative care.  Dietary changes, exercise, and maintenance of a healthy weight were discussed at length.  - Lipid Panel; Future    5. Low vitamin D level  Continue the current dose of vitamin D supplementation.  We will check a vitamin D level with her next labs.  - Vitamin D 25-Hydroxy,Total (for eval of Vitamin D levels); Future    6. Class 1 obesity without serious comorbidity with body mass index (BMI) of 31.0 to 31.9 in adult, unspecified obesity type  Dietary changes, exercise, and maintenance of a healthy weight were discussed at length.    7. Encounter for screening mammogram for malignant neoplasm of breast  Annual mammogram ordered.  - BI mammo bilateral screening tomosynthesis; Future      Orders Placed This Encounter   Procedures    BI mammo bilateral screening tomosynthesis    Basic Metabolic Panel    Lipid Panel    Hemoglobin A1C    Vitamin D 25-Hydroxy,Total (for eval of Vitamin D levels)     Requested Prescriptions      Signed Prescriptions Disp Refills    escitalopram (Lexapro) 20 mg tablet 90 tablet 1     Sig: Take 1 tablet (20 mg) by mouth once daily.

## 2023-12-12 ENCOUNTER — TRANSCRIBE ORDERS (OUTPATIENT)
Dept: ADMINISTRATIVE | Age: 48
End: 2023-12-12

## 2023-12-12 DIAGNOSIS — Z12.31 ENCOUNTER FOR SCREENING MAMMOGRAM FOR MALIGNANT NEOPLASM OF BREAST: Primary | ICD-10-CM

## 2023-12-15 ENCOUNTER — HOSPITAL ENCOUNTER (OUTPATIENT)
Dept: RADIOLOGY | Facility: EXTERNAL LOCATION | Age: 48
Discharge: HOME | End: 2023-12-15

## 2023-12-15 DIAGNOSIS — Z12.31 ENCOUNTER FOR SCREENING MAMMOGRAM FOR MALIGNANT NEOPLASM OF BREAST: ICD-10-CM

## 2024-01-11 ENCOUNTER — HOSPITAL ENCOUNTER (OUTPATIENT)
Dept: WOMENS IMAGING | Age: 49
Discharge: HOME | End: 2024-01-13
Payer: COMMERCIAL

## 2024-01-11 VITALS — BODY MASS INDEX: 31.64 KG/M2 | WEIGHT: 162 LBS

## 2024-01-11 DIAGNOSIS — Z12.31 ENCOUNTER FOR SCREENING MAMMOGRAM FOR MALIGNANT NEOPLASM OF BREAST: ICD-10-CM

## 2024-01-11 PROCEDURE — 77063 BREAST TOMOSYNTHESIS BI: CPT

## 2024-01-15 ENCOUNTER — TELEPHONE (OUTPATIENT)
Dept: PRIMARY CARE | Facility: CLINIC | Age: 49
End: 2024-01-15
Payer: COMMERCIAL

## 2024-01-15 NOTE — TELEPHONE ENCOUNTER
See CALEB results   
Pt admitted for SOB/Pleural effusion. Pt with history of non-Hodgkins's lymphoma. Pt is s/p VATs with chest tubes X2.
Pt admitted for non-Hodgkin's lymphoma, SOB, pleural effusion, respiratory distress. Pt has chest tube in place.

## 2024-02-09 ENCOUNTER — OFFICE VISIT (OUTPATIENT)
Dept: ORTHOPEDIC SURGERY | Facility: CLINIC | Age: 49
End: 2024-02-09
Payer: COMMERCIAL

## 2024-02-09 DIAGNOSIS — M77.8 TENDONITIS OF ELBOW, LEFT: ICD-10-CM

## 2024-02-09 DIAGNOSIS — M77.8 TENDINITIS OF LEFT WRIST: ICD-10-CM

## 2024-02-09 DIAGNOSIS — M77.8 SHOULDER TENDONITIS, LEFT: ICD-10-CM

## 2024-02-09 PROCEDURE — 99203 OFFICE O/P NEW LOW 30 MIN: CPT | Performed by: FAMILY MEDICINE

## 2024-02-09 PROCEDURE — 3008F BODY MASS INDEX DOCD: CPT | Performed by: FAMILY MEDICINE

## 2024-02-09 PROCEDURE — 1036F TOBACCO NON-USER: CPT | Performed by: FAMILY MEDICINE

## 2024-02-09 ASSESSMENT — PAIN SCALES - GENERAL: PAINLEVEL_OUTOF10: 7

## 2024-02-09 ASSESSMENT — PAIN - FUNCTIONAL ASSESSMENT: PAIN_FUNCTIONAL_ASSESSMENT: 0-10

## 2024-02-09 ASSESSMENT — PAIN DESCRIPTION - DESCRIPTORS: DESCRIPTORS: BURNING;HEAVINESS

## 2024-02-09 NOTE — PROGRESS NOTES
NPV L arm pain    Subjective    Patient ID: Connie Paige is a 48 y.o. female.    Chief Complaint: Pain of the Left Arm  Connie is a very pleasant 48-year-old female who presents with a 3-month history of left upper extremity pain.  She cannot really remember any specific injury or trauma to the arm.  Sometimes it is in her shoulder, sometimes at the elbow, and sometimes it is in the wrist and hand.  She states that it is very random.  It is a burning type heaviness.  Currently it does not feel too bad, but the pain can get to a 7 out of 10 at its worst.  She has tried activity modification and heat.  She has not done any over-the-counter anti-inflammatories.  She has not done any exercises to this point.    Objective   Musculoskeletal: She has a fairly normal left upper extremity exam.  Her cervical spine exam was unremarkable with no midline tenderness to palpation or step-off.  Full range of motion of the neck.  Negative Spurling's bilaterally.    Image Results: No imaging was obtained today.      Assessment/Plan   Encounter Diagnoses:  Shoulder tendonitis, left    Tendonitis of elbow, left    Tendinitis of left wrist    Orders Placed This Encounter    Referral to Physical Therapy   We had a long discussion regarding her treatment options.  My hope is that formal physical therapy will help narrow down the issue to a more specific location.  Even better, if it completely resolves the issue.  If it does not, we will get further imaging once we have narrowed it down.  We reviewed the red flags of left upper extremity pain.  I would like her to take some over-the-counter anti-inflammatories in the next few days and then stop.  All of her questions were answered and she agrees with treatment plan.    ** Please excuse any errors in grammar or translation related to this dictation. Voice recognition software was utilized to prepare this document. **    Eduardo Diallo M.D.  Clinical ,  Division of Sports Medicine  Primary Care Sports Medicine  Department of Orthopedic Surgery  [facility]

## 2024-02-23 NOTE — PROGRESS NOTES
Physical Therapy  Physical Therapy Evaluation    Patient Name: Connie Paige  MRN: [id number]  Today's Date: 2/26/2024  Time Calculation  Start Time: 0500  Stop Time: 0545  Time Calculation (min): 45 min  Insurance:  lefT // [place] confirmed 2/22/24 8:00pm  PT COPAY 50 DED 0  COVERAGE 100 OOP [id number])   Visit number: 1   Authorization info: NO AUTH REQ   Insurance Type: MERITIAN THRU AETNA BMN     General:  Reason for visit: L shou pain  Referred by: MD Imani    Current Problem:  M77.8 - Shoulder tendonitis, left M77.8 - Tendonitis of elbow,     Precautions: NA         Medical History Form: Reviewed (scanned into chart)    Subjective:     Chief Complaint: Patient presents to clinic L kvng pain  Onset Date: 10/31/2023  FREIDA: Insidious    Current Condition:   Same    Pain:  Pain Assessment: 0-10  Pain Score: 3  Pain Type: Deep somatic pain  Pain Location: Shoulder  Pain Orientation: Left    Aggravating Factors:  Reaching Overhead  Relieving Factors:  Rest      Prior Level of Function (PLOF)  Patient previously independent with all ADLs  Exercise/Physical Activity:   Work/ FT worker    Patients Living Environment: Reviewed and no concern    Primary Language: English    Patient's Goal(s) for Therapy: Reduce goals    Red Flags: Do you have any of the following? No  Fever/chills, unexplained weight changes, dizziness/fainting, unexplained change in bowel or bladder functions, unexplained malaise or muscle weakness, night pain/sweats, numbness or tingling    Objective:  Shoulder PROM (* indicates pain)  Flexion: L   ABD: L   ER: L   IR: L     Shoulder AROM (* indicates pain)  Flexion: L 70  ABD: L  10  Functional ER: L 30  Functional IR: L 45    Shoulder MMT (0/5)  (* indicates pain)  Flex: L 3-  ABD: L 3-  IR: L 3  ER: L 3-    Palpation:  Posture: Rounded shoulders    Special Tests;  (+) Empty Can  (+) Yocums  (+) Ulnar collateral stress    Outcome Measures:  Quick DASH 22      EDUCATION: Pt educated in  PT POC, pt  demonstrates understanding of PT info, all questions answered.    HEP: see below    Goals: Set and discussed today  Increase AROM to WNL  Increase Strength by 1/3 to 1 mm grade where deficits noted  Ind w/ HEP  Return to PLOF  Reduce c/o Pain to 2/10 or less      Plan of care was developed with input and agreement by the patient.    Treatment Performed:    Therapeutic Exercise:    23 min    Access Code: [email or web address]  URL: [email or web address]  Date: 02/26/2024  Prepared by: John Gil, PT    Exercises  - Seated Gripping Towel  - 1 x daily - 7 x weekly - 1-3 sets - 10 reps  - Seated Shoulder Flexion Towel Slide at Table Top  - 1 x daily - 7 x weekly - 1-3 sets - 10 reps  - Seated Shoulder Abduction Towel Slide at Table Top  - 1 x daily - 7 x weekly - 1-3 sets - 10 reps  - Supine Bilateral Punches  - 1 x daily - 7 x weekly - 1-3 sets - 10 reps    Assessment: 49 y/o F presents with c/o L kvng pain. Upon examination patient demonstrates moderate pain limiting overall functional mobility including dressing. Activity limitations and participations restrictions include normal home function. Pt to benefit from outpatient PT to address deficits, maximize functional mobility and improve QOL.  The clinical presentation of this patient is stable and their history and examination findings are consistent with a low complexity evaluation with good rehab potential.            Plan:     Planned Interventions include: therapeutic exercise, self-care home management, manual therapy, therapeutic activities, gait training, neuromuscular coordination, vasopneumatic, dry needling, aquatic therapy  Frequency: 2x/wk  Duration: 4weeks    John Gil, PT

## 2024-02-26 ENCOUNTER — EVALUATION (OUTPATIENT)
Dept: PHYSICAL THERAPY | Facility: CLINIC | Age: 49
End: 2024-02-26
Payer: COMMERCIAL

## 2024-02-26 DIAGNOSIS — G89.29 CHRONIC PAIN IN LEFT SHOULDER: Primary | ICD-10-CM

## 2024-02-26 DIAGNOSIS — M77.8 TENDONITIS OF ELBOW, LEFT: ICD-10-CM

## 2024-02-26 DIAGNOSIS — M25.512 CHRONIC PAIN IN LEFT SHOULDER: Primary | ICD-10-CM

## 2024-02-26 DIAGNOSIS — M77.8 TENDINITIS OF LEFT WRIST: ICD-10-CM

## 2024-02-26 DIAGNOSIS — M77.8 SHOULDER TENDONITIS, LEFT: ICD-10-CM

## 2024-02-26 PROCEDURE — 97110 THERAPEUTIC EXERCISES: CPT | Mod: GP

## 2024-02-26 PROCEDURE — 97161 PT EVAL LOW COMPLEX 20 MIN: CPT | Mod: GP

## 2024-02-26 ASSESSMENT — ENCOUNTER SYMPTOMS
LOSS OF SENSATION IN FEET: 0
DEPRESSION: 0
OCCASIONAL FEELINGS OF UNSTEADINESS: 0

## 2024-02-26 ASSESSMENT — PAIN - FUNCTIONAL ASSESSMENT: PAIN_FUNCTIONAL_ASSESSMENT: 0-10

## 2024-02-26 ASSESSMENT — PAIN SCALES - GENERAL: PAINLEVEL_OUTOF10: 3

## 2024-03-08 ENCOUNTER — APPOINTMENT (OUTPATIENT)
Dept: ORTHOPEDIC SURGERY | Facility: CLINIC | Age: 49
End: 2024-03-08
Payer: COMMERCIAL

## 2024-03-11 ENCOUNTER — APPOINTMENT (OUTPATIENT)
Dept: PHYSICAL THERAPY | Facility: CLINIC | Age: 49
End: 2024-03-11
Payer: COMMERCIAL

## 2024-03-13 ENCOUNTER — APPOINTMENT (OUTPATIENT)
Dept: PHYSICAL THERAPY | Facility: CLINIC | Age: 49
End: 2024-03-13
Payer: COMMERCIAL

## 2024-03-18 ENCOUNTER — APPOINTMENT (OUTPATIENT)
Dept: PHYSICAL THERAPY | Facility: CLINIC | Age: 49
End: 2024-03-18
Payer: COMMERCIAL

## 2024-03-21 ENCOUNTER — APPOINTMENT (OUTPATIENT)
Dept: PHYSICAL THERAPY | Facility: CLINIC | Age: 49
End: 2024-03-21
Payer: COMMERCIAL

## 2024-03-25 ENCOUNTER — APPOINTMENT (OUTPATIENT)
Dept: PHYSICAL THERAPY | Facility: CLINIC | Age: 49
End: 2024-03-25
Payer: COMMERCIAL

## 2024-03-25 ENCOUNTER — APPOINTMENT (OUTPATIENT)
Dept: ORTHOPEDIC SURGERY | Facility: CLINIC | Age: 49
End: 2024-03-25
Payer: COMMERCIAL

## 2024-03-28 ENCOUNTER — APPOINTMENT (OUTPATIENT)
Dept: PHYSICAL THERAPY | Facility: CLINIC | Age: 49
End: 2024-03-28
Payer: COMMERCIAL

## 2024-03-28 NOTE — PROGRESS NOTES
Physical Therapy Treatment    Patient Name: Connie Paige  MRN: [id number]  Today's Date: 3/28/2024       Current Problem  No diagnosis found.    Insurance:  COPAY 50 DED 0  COVERAGE 100 OOP [id number])   Visit number: 1   Authorization info: NO AUTH REQ   Insurance Type: MERITIAN THRU AETNA BMN     Precautions       Subjective     Pain         Objective     Treatments:  Ther Ex: ***    Manual: ***    Assessment:  Pt able to tolerate tx session well this date w/ no adverse effects noted from tx.  Pt compliant w/ program and appears to understand rationale for therapy. Still requires skilled therapy for increasing strength/ROM for performing ADLs.          Plan:  Cont w current POC and progress pt as tolerated.        John Gil, PT

## 2024-04-01 ENCOUNTER — APPOINTMENT (OUTPATIENT)
Dept: PHYSICAL THERAPY | Facility: CLINIC | Age: 49
End: 2024-04-01
Payer: COMMERCIAL

## 2024-05-16 ENCOUNTER — APPOINTMENT (OUTPATIENT)
Dept: PRIMARY CARE | Facility: CLINIC | Age: 49
End: 2024-05-16
Payer: COMMERCIAL

## 2024-05-21 ENCOUNTER — LAB (OUTPATIENT)
Dept: LAB | Facility: LAB | Age: 49
End: 2024-05-21
Payer: COMMERCIAL

## 2024-05-21 DIAGNOSIS — R73.01 IFG (IMPAIRED FASTING GLUCOSE): ICD-10-CM

## 2024-05-21 DIAGNOSIS — R79.89 LOW VITAMIN D LEVEL: ICD-10-CM

## 2024-05-21 DIAGNOSIS — E78.2 MIXED HYPERLIPIDEMIA: ICD-10-CM

## 2024-05-21 LAB
25(OH)D3 SERPL-MCNC: 37 NG/ML (ref 30–100)
ANION GAP SERPL CALC-SCNC: 12 MMOL/L (ref 10–20)
BUN SERPL-MCNC: 11 MG/DL (ref 6–23)
CALCIUM SERPL-MCNC: 9.1 MG/DL (ref 8.6–10.3)
CHLORIDE SERPL-SCNC: 106 MMOL/L (ref 98–107)
CHOLEST SERPL-MCNC: 220 MG/DL (ref 0–199)
CHOLESTEROL/HDL RATIO: 6.5
CO2 SERPL-SCNC: 24 MMOL/L (ref 21–32)
CREAT SERPL-MCNC: 1.01 MG/DL (ref 0.5–1.05)
EGFRCR SERPLBLD CKD-EPI 2021: 68 ML/MIN/1.73M*2
EST. AVERAGE GLUCOSE BLD GHB EST-MCNC: 117 MG/DL
GLUCOSE SERPL-MCNC: 103 MG/DL (ref 74–99)
HBA1C MFR BLD: 5.7 %
HDLC SERPL-MCNC: 34.1 MG/DL
LDLC SERPL CALC-MCNC: 145 MG/DL
NON HDL CHOLESTEROL: 186 MG/DL (ref 0–149)
POTASSIUM SERPL-SCNC: 4.4 MMOL/L (ref 3.5–5.3)
SODIUM SERPL-SCNC: 138 MMOL/L (ref 136–145)
TRIGL SERPL-MCNC: 204 MG/DL (ref 0–149)
VLDL: 41 MG/DL (ref 0–40)

## 2024-05-21 PROCEDURE — 82306 VITAMIN D 25 HYDROXY: CPT

## 2024-05-21 PROCEDURE — 36415 COLL VENOUS BLD VENIPUNCTURE: CPT

## 2024-05-21 PROCEDURE — 80061 LIPID PANEL: CPT

## 2024-05-21 PROCEDURE — 83036 HEMOGLOBIN GLYCOSYLATED A1C: CPT

## 2024-05-21 PROCEDURE — 80048 BASIC METABOLIC PNL TOTAL CA: CPT

## 2024-05-23 ENCOUNTER — OFFICE VISIT (OUTPATIENT)
Dept: PRIMARY CARE | Facility: CLINIC | Age: 49
End: 2024-05-23
Payer: COMMERCIAL

## 2024-05-23 VITALS
BODY MASS INDEX: 31.14 KG/M2 | DIASTOLIC BLOOD PRESSURE: 84 MMHG | OXYGEN SATURATION: 97 % | HEIGHT: 60 IN | WEIGHT: 158.6 LBS | HEART RATE: 57 BPM | TEMPERATURE: 98.1 F | SYSTOLIC BLOOD PRESSURE: 134 MMHG

## 2024-05-23 DIAGNOSIS — E78.2 MIXED HYPERLIPIDEMIA: ICD-10-CM

## 2024-05-23 DIAGNOSIS — R03.0 BLOOD PRESSURE ELEVATED WITHOUT HISTORY OF HTN: ICD-10-CM

## 2024-05-23 DIAGNOSIS — E55.9 VITAMIN D DEFICIENCY: ICD-10-CM

## 2024-05-23 DIAGNOSIS — R73.01 IFG (IMPAIRED FASTING GLUCOSE): ICD-10-CM

## 2024-05-23 DIAGNOSIS — E66.9 CLASS 1 OBESITY WITHOUT SERIOUS COMORBIDITY WITH BODY MASS INDEX (BMI) OF 30.0 TO 30.9 IN ADULT, UNSPECIFIED OBESITY TYPE: ICD-10-CM

## 2024-05-23 DIAGNOSIS — R79.89 LOW VITAMIN D LEVEL: ICD-10-CM

## 2024-05-23 DIAGNOSIS — F33.9 RECURRENT MAJOR DEPRESSIVE DISORDER, REMISSION STATUS UNSPECIFIED (CMS-HCC): Primary | ICD-10-CM

## 2024-05-23 PROCEDURE — 99213 OFFICE O/P EST LOW 20 MIN: CPT | Performed by: FAMILY MEDICINE

## 2024-05-23 PROCEDURE — 3008F BODY MASS INDEX DOCD: CPT | Performed by: FAMILY MEDICINE

## 2024-05-23 RX ORDER — ESCITALOPRAM OXALATE 20 MG/1
20 TABLET ORAL DAILY
Qty: 90 TABLET | Refills: 1 | Status: SHIPPED | OUTPATIENT
Start: 2024-05-23

## 2024-05-23 ASSESSMENT — PATIENT HEALTH QUESTIONNAIRE - PHQ9
SUM OF ALL RESPONSES TO PHQ9 QUESTIONS 1 AND 2: 0
1. LITTLE INTEREST OR PLEASURE IN DOING THINGS: NOT AT ALL
2. FEELING DOWN, DEPRESSED OR HOPELESS: NOT AT ALL

## 2024-05-23 NOTE — PROGRESS NOTES
Subjective   Patient ID: Connie Paige is a 49 y.o. female who presents for 6 month follow up monitoring and management of multiple medical conditions.      HPI     Patient complains of scratch on her right wrist.    Pt c/o of tingling at night in her hands and wrists onset 2 weeks ago.  Pt states that she wears a brace at night that helps.     Labs: 5/21/24    Pt has depression.   Sxs are stable on current medication.   Denies suicidal ideation.      Pt has vitamin D deficiency.   She is compliant with her OTC supplement.      She has impaired fasting glucose based on labs.  Patient denies any polyuria, polydipsia, polyphagia.     She has hyperlipidemia.  Has been treated conservatively with dietary changes, exercise, and maintenance of healthy weight.         Review of Systems  Constitutional: Patient denies any fever, chills, loss of appetite, or unexplained weight loss.  Cardiovascular: Patient denies any chest pain, shortness of breath with exertion, tachycardia, palpitations, orthopnea, or paroxysmal nocturnal dyspnea.  Respiratory: Patient denies any cough, shortness breath, or wheezing.  Gastrointestinal: Patient denies any nausea, vomiting, diarrhea, constipation, melena, hematochezia, or reflux symptoms  Skin: Denies any rashes or skin lesions    Neurology: Has some numbness in hands at night.  Patient denies any tremor, seizures, or gait instability.    Endocrinology: Denies any polyuria, polydipsia, polyphagia, or heat/cold intolerance.  Psychiatric: He denies any depression, or suicidal/homicidal ideation. Anxiety symptoms have been stable with the current medication.      Objective   /84   Pulse 57   Temp 36.7 °C (98.1 °F)   Ht 1.524 m (5')   Wt 71.9 kg (158 lb 9.6 oz)   SpO2 97%   BMI 30.97 kg/m²     Physical Exam  General Appearance: Alert and cooperative, in no acute distress, well-developed/well-nourished.  Neck: Supple and without adenopathy or rigidity. There is no JVD at 90° and  no carotid bruits are noted. There is no thyromegaly, thyroid tenderness, or palpable thyroid nodules.  Heart: Regular rate and rhythm without murmur or ectopy.  Lungs: Clear to auscultation bilaterally with good air exchange.  Skin: Good turgor, moist, warm and without rashes or lesions.  Neurological exam: Alert and oriented ×3, no tremor, normal gait.  Extremities: No clubbing, cyanosis, or edema  Psychiatric: Appropriate mood and affect, good insight and judgment, no delusions or thought disorders, no suicidal or homicidal ideation      Assessment/Plan       Recurrent major depressive disorder:  Stable.  We will continue the current medication without change.    Blood pressure elevated without history of HTN:  Blood pressure not in ideal range.  Recommended a low-salt diet and maintenance of a healthy weight.  We will continue to monitor.    IFG (impaired fasting glucose):  Last A1c was:  Lab Results   Component Value Date    HGBA1C 5.7 (H) 05/21/2024   Dietary changes, exercise, and maintenance of a healthy weight were discussed at length.    Mixed hyperlipidemia:  Stable based on labs.  Will continue with conservative care.  Dietary changes, exercise, and maintenance of a healthy weight were discussed at length.    Vit D deficiency:  Continue the current dose of vitamin D supplementation.  We will check a vitamin D level with her next labs.      Obesity:  Dietary changes, exercise, and maintenance of a healthy weight were discussed at length.    Paraesthesia of hands:  Recommended wrist splints at night to prevent flexing of the wrists while sleeping.  Recommend EMG to further evaluate if symptoms persist for a full 30 days.    MAMM DUE 1/12/25    Follow up in 6 months.     Scribe Attestation  By signing my name below, IRamesh Scribe   attest that this documentation has been prepared under the direction and in the presence of Jean-Claude Leary DO.      Requested Prescriptions     Signed Prescriptions  Disp Refills    escitalopram (Lexapro) 20 mg tablet 90 tablet 1     Sig: Take 1 tablet (20 mg) by mouth once daily.

## 2024-05-23 NOTE — PATIENT INSTRUCTIONS
Follow up in 6 months.    It was a pleasure to see you today. Thank you for choosing us for your health care needs.    If you have lab or other testing completed and have not been informed of results within one week, please call the office for your results.    If you haven't done so, consider signing up for [facility], the [facility] personal health record. Ask the staff how you can get started.

## 2024-10-09 DIAGNOSIS — F33.9 RECURRENT MAJOR DEPRESSIVE DISORDER, REMISSION STATUS UNSPECIFIED (CMS-HCC): ICD-10-CM

## 2024-10-10 RX ORDER — ESCITALOPRAM OXALATE 20 MG/1
20 TABLET ORAL DAILY
Qty: 90 TABLET | Refills: 0 | Status: SHIPPED | OUTPATIENT
Start: 2024-10-10

## 2024-11-25 ENCOUNTER — APPOINTMENT (OUTPATIENT)
Dept: PRIMARY CARE | Facility: CLINIC | Age: 49
End: 2024-11-25
Payer: COMMERCIAL

## 2024-12-12 ENCOUNTER — APPOINTMENT (OUTPATIENT)
Dept: PRIMARY CARE | Facility: CLINIC | Age: 49
End: 2024-12-12
Payer: COMMERCIAL

## 2024-12-12 VITALS
BODY MASS INDEX: 31.43 KG/M2 | WEIGHT: 160.1 LBS | HEART RATE: 54 BPM | HEIGHT: 60 IN | DIASTOLIC BLOOD PRESSURE: 71 MMHG | TEMPERATURE: 97.9 F | SYSTOLIC BLOOD PRESSURE: 140 MMHG | OXYGEN SATURATION: 93 %

## 2024-12-12 DIAGNOSIS — E55.9 VITAMIN D DEFICIENCY: ICD-10-CM

## 2024-12-12 DIAGNOSIS — E66.811 CLASS 1 OBESITY WITHOUT SERIOUS COMORBIDITY WITH BODY MASS INDEX (BMI) OF 31.0 TO 31.9 IN ADULT, UNSPECIFIED OBESITY TYPE: ICD-10-CM

## 2024-12-12 DIAGNOSIS — I10 PRIMARY HYPERTENSION: ICD-10-CM

## 2024-12-12 DIAGNOSIS — N95.1 HOT FLASHES DUE TO MENOPAUSE: ICD-10-CM

## 2024-12-12 DIAGNOSIS — R73.01 IFG (IMPAIRED FASTING GLUCOSE): ICD-10-CM

## 2024-12-12 DIAGNOSIS — F33.9 RECURRENT MAJOR DEPRESSIVE DISORDER, REMISSION STATUS UNSPECIFIED (CMS-HCC): Primary | ICD-10-CM

## 2024-12-12 DIAGNOSIS — E78.2 MIXED HYPERLIPIDEMIA: ICD-10-CM

## 2024-12-12 DIAGNOSIS — Z12.31 ENCOUNTER FOR SCREENING MAMMOGRAM FOR MALIGNANT NEOPLASM OF BREAST: ICD-10-CM

## 2024-12-12 PROCEDURE — 3078F DIAST BP <80 MM HG: CPT | Performed by: FAMILY MEDICINE

## 2024-12-12 PROCEDURE — 3008F BODY MASS INDEX DOCD: CPT | Performed by: FAMILY MEDICINE

## 2024-12-12 PROCEDURE — 3077F SYST BP >= 140 MM HG: CPT | Performed by: FAMILY MEDICINE

## 2024-12-12 PROCEDURE — 99214 OFFICE O/P EST MOD 30 MIN: CPT | Performed by: FAMILY MEDICINE

## 2024-12-12 RX ORDER — AMLODIPINE BESYLATE 5 MG/1
5 TABLET ORAL DAILY
Qty: 90 TABLET | Refills: 0 | Status: SHIPPED | OUTPATIENT
Start: 2024-12-12

## 2024-12-12 RX ORDER — ESCITALOPRAM OXALATE 20 MG/1
20 TABLET ORAL DAILY
Qty: 90 TABLET | Refills: 0 | Status: SHIPPED | OUTPATIENT
Start: 2024-12-12

## 2024-12-12 NOTE — PROGRESS NOTES
Subjective   Patient ID: Connie Paige is a 49 y.o. female who presents for Follow-up.    HPI       Pt c/o of hot flashes.   Pt had a hysterectomy in 2016.    Pt declines flu and covid vaccines.       No recent BW  Mammo: 1/2024  Colonoscopy: 04/07/2023      Pt has depression.   Sxs are stable on current medication.   Denies suicidal ideation.      Pt has vitamin D deficiency.   She is compliant with her OTC supplement.      She has impaired fasting glucose based on labs.  Patient denies any polyuria, polydipsia, polyphagia.     She has hyperlipidemia.  Up to this point, she has been treated conservatively with dietary changes, exercise, and maintenance of healthy weight.       Review of Systems  Constitutional: Patient denies any fever, chills, loss of appetite, or unexplained weight loss.  Positive for hot flashes.     Cardiovascular: Patient denies any chest pain, shortness of breath with exertion, tachycardia, palpitations, orthopnea, or paroxysmal nocturnal dyspnea.  Respiratory: Patient denies any cough, shortness of breath, or wheezing.  Gastrointestinal: Patient denies any nausea, vomiting, diarrhea, constipation, melena, hematochezia, or reflux symptoms  Skin: Denies any rashes or skin lesions  Neurology: Patient denies any new motor or sensory losses. Denies any numbness, tingling, weakness, and incoordination of the extremities. Patient also denies any tremor, seizures, or gait instability.  Endocrinology: Denies any polyuria, polydipsia, polyphagia, or heat/cold intolerance.  Psychiatric: Patient denies any depression, or suicidal/homicidal ideation. Anxiety symptoms have been stable with the current medication.    Objective   /71   Pulse 54   Temp 36.6 °C (97.9 °F) (Temporal)   Ht 1.524 m (5')   Wt 72.6 kg (160 lb 1.6 oz)   SpO2 93%   BMI 31.27 kg/m²     Physical Exam  General Appearance: Alert and cooperative, in no acute distress, well-developed/well-nourished, obese female.     Neck:  Supple and without adenopathy or rigidity. There is no JVD at 90° and no carotid bruits are noted. There is no thyromegaly, thyroid tenderness, or palpable thyroid nodules.  Heart: Regular rate and rhythm without murmur or ectopy.  Lungs: Clear to auscultation bilaterally with good air exchange.  Skin: Good turgor, moist, warm and without rashes or lesions.  Neurological exam: Alert and oriented ×3, no tremor, normal gait.  Extremities: No clubbing, cyanosis, or edema  Psychiatric: Appropriate mood and affect, good insight and judgment, no delusions or thought disorders, no suicidal or homicidal ideation    Assessment/Plan     Recurrent major depressive disorder:  Stable based on symptoms.  We will continue the current medication without change.     Primary hypertension:  Blood pressure has remained elevated and treatment is needed at this point.  Risks, benefits, and side effects of blood pressure medication were discussed at length. Questions were answered to the patient's satisfaction, the patient wishes to proceed with treatment.    Will prescribe amlodipine to help lower her blood pressure.   Advised her to watch for swelling of the lower extremities as a possible side effect of the amlodipine.   Recommended a low-salt diet and maintenance of a healthy weight.  - amlodipine (Norvasc) 5 mg tablet; Take 1 tablet (5 mg) by mouth once daily. Dispense: 90 tablet; Refill: 0     IFG (impaired fasting glucose):  Lab Results   Component Value Date    HGBA1C 5.7 (H) 05/21/2024   Dietary changes, exercise, and maintenance of a healthy weight were discussed at length.     Mixed hyperlipidemia:  Stable based on labs.  Will continue with conservative care.  Dietary changes, exercise, and maintenance of a healthy weight were discussed at length.  Lab Results   Component Value Date    CHOL 220 (H) 05/21/2024    CHOL 214 (H) 05/09/2023    CHOL 159 04/25/2022     Lab Results   Component Value Date    HDL 34.1 05/21/2024    HDL  32.9 (A) 05/09/2023    HDL 24.0 (A) 04/25/2022     Lab Results   Component Value Date    LDLCALC 145 (H) 05/21/2024     Lab Results   Component Value Date    TRIG 204 (H) 05/21/2024    TRIG 289 (H) 05/09/2023    TRIG 125 04/25/2022        Vit D deficiency:  Continue the current dose of vitamin D supplementation.  We will check a vitamin D level with her next labs.     Obesity:  Dietary changes, exercise, and maintenance of a healthy weight were discussed at length.  Goal is to achieve a BMI less than 25.    Encounter for screening mammogram for malignant neoplasm of breast:  Ordered mammogram screening.     Hot flashes due to menopause:  Will order a TSH, FSH, and LH for further evaluation.  Pt may be perimenopausal based on age and symptoms.      MAMM DUE 1/12/25 (ordered 12/12/2024)    Follow up in 6 months.       Scribe Attestation  By signing my name below, IRamesh Scribe   attest that this documentation has been prepared under the direction and in the presence of Jean-Claude Leary DO.      Orders Placed This Encounter   Procedures    BI mammo bilateral screening tomosynthesis    FSH & LH    TSH with reflex to Free T4 if abnormal    Hemoglobin A1C    Basic Metabolic Panel     Requested Prescriptions     Signed Prescriptions Disp Refills    escitalopram (Lexapro) 20 mg tablet 90 tablet 0     Sig: Take 1 tablet (20 mg) by mouth once daily.    amLODIPine (Norvasc) 5 mg tablet 90 tablet 0     Sig: Take 1 tablet (5 mg) by mouth once daily.

## 2025-01-14 ENCOUNTER — TRANSCRIBE ORDERS (OUTPATIENT)
Dept: WOMENS IMAGING | Age: 50
End: 2025-01-14

## 2025-01-14 ENCOUNTER — HOSPITAL ENCOUNTER (OUTPATIENT)
Dept: WOMENS IMAGING | Age: 50
Discharge: HOME | End: 2025-01-16
Payer: COMMERCIAL

## 2025-01-14 DIAGNOSIS — Z12.31 SCREENING MAMMOGRAM FOR BREAST CANCER: Primary | ICD-10-CM

## 2025-01-14 DIAGNOSIS — Z12.31 SCREENING MAMMOGRAM FOR BREAST CANCER: ICD-10-CM

## 2025-01-14 PROCEDURE — 77063 BREAST TOMOSYNTHESIS BI: CPT

## 2025-03-24 LAB
ANION GAP SERPL CALCULATED.4IONS-SCNC: 11 MMOL/L (CALC) (ref 7–17)
BUN SERPL-MCNC: 10 MG/DL (ref 7–25)
BUN/CREAT SERPL: NORMAL (CALC) (ref 6–22)
CALCIUM SERPL-MCNC: 9.5 MG/DL (ref 8.6–10.4)
CHLORIDE SERPL-SCNC: 104 MMOL/L (ref 98–110)
CO2 SERPL-SCNC: 24 MMOL/L (ref 20–32)
CREAT SERPL-MCNC: 0.91 MG/DL (ref 0.5–1.03)
EGFRCR SERPLBLD CKD-EPI 2021: 76 ML/MIN/1.73M2
GLUCOSE SERPL-MCNC: 97 MG/DL (ref 65–99)
POTASSIUM SERPL-SCNC: 4.6 MMOL/L (ref 3.5–5.3)
SODIUM SERPL-SCNC: 139 MMOL/L (ref 135–146)

## 2025-03-25 LAB
EST. AVERAGE GLUCOSE BLD GHB EST-MCNC: 123 MG/DL
EST. AVERAGE GLUCOSE BLD GHB EST-SCNC: 6.8 MMOL/L
FSH SERPL-ACNC: 62.9 MIU/ML
HBA1C MFR BLD: 5.9 % OF TOTAL HGB
LH SERPL-ACNC: 57.7 MIU/ML
TSH SERPL-ACNC: 1.93 MIU/L

## 2025-03-27 ENCOUNTER — APPOINTMENT (OUTPATIENT)
Dept: PRIMARY CARE | Facility: CLINIC | Age: 50
End: 2025-03-27
Payer: COMMERCIAL

## 2025-03-27 VITALS
BODY MASS INDEX: 30.78 KG/M2 | WEIGHT: 156.8 LBS | HEIGHT: 60 IN | TEMPERATURE: 98.2 F | DIASTOLIC BLOOD PRESSURE: 77 MMHG | OXYGEN SATURATION: 95 % | HEART RATE: 56 BPM | SYSTOLIC BLOOD PRESSURE: 144 MMHG

## 2025-03-27 DIAGNOSIS — R73.01 IFG (IMPAIRED FASTING GLUCOSE): ICD-10-CM

## 2025-03-27 DIAGNOSIS — Z00.00 WELL ADULT EXAM: Primary | ICD-10-CM

## 2025-03-27 DIAGNOSIS — E66.811 CLASS 1 OBESITY WITH SERIOUS COMORBIDITY AND BODY MASS INDEX (BMI) OF 30.0 TO 30.9 IN ADULT, UNSPECIFIED OBESITY TYPE: ICD-10-CM

## 2025-03-27 DIAGNOSIS — E55.9 VITAMIN D DEFICIENCY: ICD-10-CM

## 2025-03-27 DIAGNOSIS — E78.2 MIXED HYPERLIPIDEMIA: ICD-10-CM

## 2025-03-27 DIAGNOSIS — F33.9 RECURRENT MAJOR DEPRESSIVE DISORDER, REMISSION STATUS UNSPECIFIED: ICD-10-CM

## 2025-03-27 DIAGNOSIS — I10 PRIMARY HYPERTENSION: ICD-10-CM

## 2025-03-27 PROCEDURE — 3078F DIAST BP <80 MM HG: CPT | Performed by: FAMILY MEDICINE

## 2025-03-27 PROCEDURE — 3008F BODY MASS INDEX DOCD: CPT | Performed by: FAMILY MEDICINE

## 2025-03-27 PROCEDURE — 99396 PREV VISIT EST AGE 40-64: CPT | Performed by: FAMILY MEDICINE

## 2025-03-27 PROCEDURE — 3077F SYST BP >= 140 MM HG: CPT | Performed by: FAMILY MEDICINE

## 2025-03-27 RX ORDER — AMLODIPINE AND BENAZEPRIL HYDROCHLORIDE 5; 10 MG/1; MG/1
1 CAPSULE ORAL DAILY
Qty: 90 CAPSULE | Refills: 0 | Status: SHIPPED | OUTPATIENT
Start: 2025-03-27

## 2025-03-27 RX ORDER — AMLODIPINE BESYLATE 5 MG/1
5 TABLET ORAL DAILY
Qty: 90 TABLET | Refills: 0 | Status: CANCELLED | OUTPATIENT
Start: 2025-03-27

## 2025-03-27 ASSESSMENT — PATIENT HEALTH QUESTIONNAIRE - PHQ9
1. LITTLE INTEREST OR PLEASURE IN DOING THINGS: NOT AT ALL
SUM OF ALL RESPONSES TO PHQ9 QUESTIONS 1 AND 2: 0
2. FEELING DOWN, DEPRESSED OR HOPELESS: NOT AT ALL

## 2025-03-27 NOTE — PROGRESS NOTES
Subjective   Patient ID: Connie Paige is a 50 y.o. female who presents for Annual Exam and follow up monitoring and management of multiple medical conditions.      HPI     No new concerns       Patient was started on amlodipine 5 mg daily at her last office visit.  She states she is tolerating the medication well and denies any noted side effects.  Patient did not take her BP medication last night as she ran out of medication and needs a refill.        Review Labs: 03/24/2025  Mammo: 1/2025  Colonoscopy: 04/07/2023       Pt has hypertension.  Patient does not monitor BP at home.   Denies CP, SOB, dizziness, and LE edema.   Patient is compliant with antihypertensive therapy and denies any noted side effects.     Pt has hyperlipidemia.  Up to this point, she has been treated conservatively with dietary changes, exercise, and maintenance of healthy weight.    Pt has depression.   Sxs are stable on current medication.   Denies suicidal ideation.      Pt has vitamin D deficiency.   She is compliant with her OTC supplement.      She has impaired fasting glucose based on labs.  Patient denies any polyuria, polydipsia, polyphagia.     Father passed away from kidney disease at age 30. Patient does not know what type of kidney disease he had.      Review of Systems  Constitutional: Patient denies any fever, chills, loss of appetite, or unexplained weight loss.  HEENT: Denies any headache, sore throat, eye pain, ear pain, decreased vision, or decreased hearing. Patient also denies any rhinorrhea.  Cardiovascular: Patient denies any chest pain, shortness of breath with exertion, tachycardia, palpitations, orthopnea, or paroxysmal nocturnal dyspnea.  Respiratory: Patient denies any cough, shortness of breath, or wheezing.  Gastrointestinal: Patient denies any nausea, vomiting, diarrhea, constipation, melena, hematochezia, or reflux symptoms  Musculoskeletal: Patient denies any myalgia, arthralgia, joint swelling, or joint  deformity  Skin: Denies any rashes or skin lesions  Neurology: Patient denies any new motor or sensory losses. Denies any numbness, tingling, weakness, and incoordination of the extremities. Patient also denies any tremor, seizures, or gait instability.  Endocrinology: Denies any polyuria, polydipsia, polyphagia, or heat/cold intolerance.  Psychiatric: Denies any anxiety, depression, or suicidal/homicidal ideation.  Hematology: Patient denies any abnormal bruising or bleeding.    Objective   /77 (BP Location: Right arm, Patient Position: Sitting, BP Cuff Size: Small adult)   Pulse 56   Temp 36.8 °C (98.2 °F) (Temporal)   Ht 1.524 m (5')   Wt 71.1 kg (156 lb 12.8 oz)   SpO2 95%   BMI 30.62 kg/m²     Physical Exam  Gen. Appearance: Alert and cooperative, no acute distress, well-developed/well-nourished obese female.    Head: Normocephalic and atraumatic  EENT: Pupils are equal round reactive to light, extraocular muscles are intact, mucous membranes are moist, external auditory canals and tympanic membranes are within normal limits bilaterally, pharynx is without erythema or exudate, there is no noted rhinorrhea.  Neck: Supple and without adenopathy, no JVD at 90° and no carotid bruits are noted. There is no thyromegaly, thyroid tenderness, or palpable thyroid nodules.  Cardiovascular: Regular rate and rhythm without murmur or ectopy.  Respiratory: Clear to auscultation bilaterally with good air exchange.  Abdomen: Soft, nontender/nondistended. No masses, guarding, rebound, or rigidity. No hepatosplenomegaly, abdominal bruits, or CVA tenderness. Bowel sounds are normal. There is no widening of the aortic pulsation.  Musculoskeletal: Patient has good range of motion of the shoulders, elbows, wrists, hips, knees. There are no noted joint effusions or deformities.  Skin: Good turgor, moist, warm and without rashes or lesions.  Lymph nodes: No cervical, clavicular, or inguinal adenopathy.  Neurological exam:  Alert and oriented ×3, no tremor, normal gait.  Psychiatric: Appropriate mood and affect, good insight and judgment, no delusions or thought disorders, no suicidal or homicidal ideation.  Extremities: No clubbing, cyanosis, or edema    Assessment/Plan       Well Exam:  Appropriate screenings for the patient's current age were discussed at length.  I encouraged a low-fat/low-cholesterol diet and routine exercise.      Primary hypertension:  12/12/2024: Patient was started on amlodipine.   3/27/2025: Her BP was elevated on in office reading.   Will discontinue the amlodipine.  Will start her on amlodipine-benazepril combination to further address her hypertension.  Advised patient to watch for the development of a dry cough as Ace Inhibitors can cause this side effect.  Will have labs done in 2 weeks to check her potassium and renal function.  - amLODIPine-benazepriL (LotreL) 5-10 mg capsule. Take 1 capsule by mouth once daily. Dispense: 90 capsule; Refill: 0    Recurrent major depressive disorder:  Stable based on symptoms.  We will continue the current medication without change.     IFG (impaired fasting glucose):  Lab Results   Component Value Date    HGBA1C 5.9 (H) 03/24/2025   Dietary changes, exercise, and maintenance of a healthy weight were discussed at length.     Mixed hyperlipidemia:  Stable based on last labs.  Will continue with conservative care.  Dietary changes, exercise, and maintenance of a healthy weight were discussed at length.  Lab Results   Component Value Date    CHOL 220 (H) 05/21/2024    CHOL 214 (H) 05/09/2023    CHOL 159 04/25/2022     Lab Results   Component Value Date    HDL 34.1 05/21/2024    HDL 32.9 (A) 05/09/2023    HDL 24.0 (A) 04/25/2022     Lab Results   Component Value Date    LDLCALC 145 (H) 05/21/2024     Lab Results   Component Value Date    TRIG 204 (H) 05/21/2024    TRIG 289 (H) 05/09/2023    TRIG 125 04/25/2022       Vit D deficiency:  Continue the current dose of vitamin D  supplementation.  We will check a vitamin D level with her next labs.  Lab Results   Component Value Date    VITD25 37 05/21/2024    VITD25 61 05/09/2023    VITD25 84 04/25/2022        Obesity:  Dietary changes, exercise, and maintenance of a healthy weight were discussed at length.  Goal is to achieve a BMI less than 25.       MAMM DUE 1/15/2026  COLONOSCOPY DUE 4/7/2033      Scribe Attestation  By signing my name below, I, Cathy Negro   attest that this documentation has been prepared under the direction and in the presence of Jean-Claude Leary DO.    Orders Placed This Encounter   Procedures    Basic Metabolic Panel     Requested Prescriptions     Signed Prescriptions Disp Refills    amLODIPine-benazepriL (LotreL) 5-10 mg capsule 90 capsule 0     Sig: Take 1 capsule by mouth once daily.

## 2025-03-27 NOTE — PATIENT INSTRUCTIONS
Ashley Booker is at the [facility].      Start the new BP medication.  Have labs in 2 weeks.    Follow up in 3 months.    It was a pleasure to see you today. Thank you for choosing us for your health care needs.    If you have lab or other testing completed and have not been informed of results within one week, please call the office for your results.    If you haven't done so, consider signing up for [facility], the [facility] personal health record. Ask the staff how you can get started.

## 2025-04-10 DIAGNOSIS — I10 PRIMARY HYPERTENSION: ICD-10-CM

## 2025-04-14 LAB
ANION GAP SERPL CALCULATED.4IONS-SCNC: 9 MMOL/L (CALC) (ref 7–17)
BUN SERPL-MCNC: 12 MG/DL (ref 7–25)
BUN/CREAT SERPL: NORMAL (CALC) (ref 6–22)
CALCIUM SERPL-MCNC: 9.5 MG/DL (ref 8.6–10.4)
CHLORIDE SERPL-SCNC: 103 MMOL/L (ref 98–110)
CO2 SERPL-SCNC: 26 MMOL/L (ref 20–32)
CREAT SERPL-MCNC: 0.87 MG/DL (ref 0.5–1.03)
EGFRCR SERPLBLD CKD-EPI 2021: 80 ML/MIN/1.73M2
GLUCOSE SERPL-MCNC: 93 MG/DL (ref 65–99)
POTASSIUM SERPL-SCNC: 4.5 MMOL/L (ref 3.5–5.3)
SODIUM SERPL-SCNC: 138 MMOL/L (ref 135–146)

## 2025-04-26 DIAGNOSIS — F33.9 RECURRENT MAJOR DEPRESSIVE DISORDER, REMISSION STATUS UNSPECIFIED: ICD-10-CM

## 2025-04-26 NOTE — TELEPHONE ENCOUNTER
"Pt call Rx line vm states that she left a vm Monday and desperately desperately needs a refill of her medication and she is \"f^^^ing aggravated\" as she is out of her medication.  "

## 2025-04-27 RX ORDER — ESCITALOPRAM OXALATE 20 MG/1
20 TABLET ORAL DAILY
Qty: 90 TABLET | Refills: 0 | Status: SHIPPED | OUTPATIENT
Start: 2025-04-27

## 2025-07-03 ENCOUNTER — APPOINTMENT (OUTPATIENT)
Dept: PRIMARY CARE | Facility: CLINIC | Age: 50
End: 2025-07-03
Payer: COMMERCIAL

## 2025-07-03 VITALS
DIASTOLIC BLOOD PRESSURE: 68 MMHG | HEIGHT: 60 IN | WEIGHT: 155.8 LBS | TEMPERATURE: 98.2 F | SYSTOLIC BLOOD PRESSURE: 126 MMHG | BODY MASS INDEX: 30.59 KG/M2 | OXYGEN SATURATION: 93 % | HEART RATE: 65 BPM

## 2025-07-03 DIAGNOSIS — E78.2 MIXED HYPERLIPIDEMIA: ICD-10-CM

## 2025-07-03 DIAGNOSIS — R73.01 IFG (IMPAIRED FASTING GLUCOSE): ICD-10-CM

## 2025-07-03 DIAGNOSIS — E55.9 VITAMIN D DEFICIENCY: ICD-10-CM

## 2025-07-03 DIAGNOSIS — I10 PRIMARY HYPERTENSION: Primary | ICD-10-CM

## 2025-07-03 PROCEDURE — 99214 OFFICE O/P EST MOD 30 MIN: CPT | Performed by: FAMILY MEDICINE

## 2025-07-03 PROCEDURE — 3078F DIAST BP <80 MM HG: CPT | Performed by: FAMILY MEDICINE

## 2025-07-03 PROCEDURE — 3074F SYST BP LT 130 MM HG: CPT | Performed by: FAMILY MEDICINE

## 2025-07-03 PROCEDURE — 3008F BODY MASS INDEX DOCD: CPT | Performed by: FAMILY MEDICINE

## 2025-07-03 RX ORDER — AMLODIPINE AND BENAZEPRIL HYDROCHLORIDE 5; 10 MG/1; MG/1
1 CAPSULE ORAL DAILY
Qty: 90 CAPSULE | Refills: 0 | Status: SHIPPED | OUTPATIENT
Start: 2025-07-03

## 2025-07-03 NOTE — PROGRESS NOTES
Subjective   Patient ID: Connie Paige is a 50 y.o. female who presents for Follow-up.    HPI     Patient follow up on BP medication management.     Pt had two moles shaved off, one on her right check and another on her chest at [facility].     Otherwise no new concerns.     No recent BW  Mammo: 01/14/2025       Pt has hypertension.  Patient does not monitor BP at home.   Denies CP, SOB, dizziness, and LE edema.   Patient is compliant with antihypertensive therapy and denies any noted side effects.     Pt has hyperlipidemia.  Up to this point, she has been treated conservatively with dietary changes, exercise, and maintenance of healthy weight.     Pt has depression.   Sxs are stable on current medication.   Denies suicidal ideation.      Pt has vitamin D deficiency.   She is compliant with her OTC supplement.      She has impaired fasting glucose based on labs.  Patient denies any polyuria, polydipsia, polyphagia.         Review of Systems  Constitutional: Patient denies any fever, chills, loss of appetite, or unexplained weight loss.  Cardiovascular: Patient denies any chest pain, shortness of breath with exertion, tachycardia, palpitations, orthopnea, or paroxysmal nocturnal dyspnea.  Respiratory: Patient denies any cough, shortness of breath, or wheezing.  Gastrointestinal: Patient denies any nausea, vomiting, diarrhea, constipation, melena, hematochezia, or reflux symptoms  Skin: Denies any rashes or skin lesions  Neurology: Patient denies any new motor or sensory losses. Denies any numbness, tingling, weakness, and incoordination of the extremities. Patient also denies any tremor, seizures, or gait instability.  Endocrinology: Denies any polyuria, polydipsia, polyphagia, or heat/cold intolerance.  Psychiatric: Patient denies any depression, anxiety or suicidal/homicidal ideation.     Objective   /68 (BP Location: Right arm, Patient Position: Sitting, BP Cuff Size: Adult)   Pulse 65   Temp 36.8  °C (98.2 °F) (Temporal)   Ht (!) 1.524 m (5')   Wt 70.7 kg (155 lb 12.8 oz)   SpO2 93%   BMI 30.43 kg/m²     Physical Exam  General Appearance: Alert and cooperative, in no acute distress, well-developed/well-nourished obese female.    Neck: Supple and without adenopathy or rigidity. There is no JVD at 90° and no carotid bruits are noted. There is no thyromegaly, thyroid tenderness, or palpable thyroid nodules.  Heart: Regular rate and rhythm without murmur or ectopy.  Lungs: Clear to auscultation bilaterally with good air exchange.  Skin: Good turgor, moist, warm and without rashes or lesions.  Neurological exam: Alert and oriented ×3, no tremor, normal gait.  Extremities: No clubbing, cyanosis, or edema  Psychiatric: Appropriate mood and affect, good insight and judgment, no delusions or thought disorders, no suicidal or homicidal ideation    Assessment/Plan     Primary hypertension:  12/12/2024:   Patient was started on amlodipine.   3/27/2025:   Her BP was elevated on in office reading.   Will discontinue the amlodipine.  Will start her on amlodipine-benazepril combination to further address her hypertension.  7/3/2025:   Blood pressure appears adequately controlled and we will continue with the current antihypertensive therapy.    Mixed hyperlipidemia:  Stable based on last labs.  Will continue with conservative care.  Dietary changes, exercise, and maintenance of a healthy weight were discussed at length.    IFG (impaired fasting glucose):  Stable on last labs.  Dietary changes, exercise, and maintenance of a healthy weight were discussed at length.  Lab Results   Component Value Date    HGBA1C 5.9 (H) 03/24/2025        Vit D deficiency:  Continue the current dose of vitamin D supplementation.  We will check a vitamin D level with her next labs.  Lab Results   Component Value Date    VITD25 37 05/21/2024    VITD25 61 05/09/2023    VITD25 84 04/25/2022           MAMM DUE 1/15/2026  COLONOSCOPY DUE  4/7/2033        Scribe Attestation  By signing my name below, I, Ramesh Goldberg , Cathy   attest that this documentation has been prepared under the direction and in the presence of Jean-Claude Leary DO.    Orders Placed This Encounter   Procedures    Hemoglobin A1C    Comprehensive Metabolic Panel    Lipid Panel    Vitamin D 25-Hydroxy,Total (for eval of Vitamin D levels)     Requested Prescriptions     Signed Prescriptions Disp Refills    amLODIPine-benazepriL (LotreL) 5-10 mg capsule 90 capsule 0     Sig: Take 1 capsule by mouth once daily.

## 2025-07-22 DIAGNOSIS — F33.9 RECURRENT MAJOR DEPRESSIVE DISORDER, REMISSION STATUS UNSPECIFIED: ICD-10-CM

## 2025-07-23 RX ORDER — ESCITALOPRAM OXALATE 20 MG/1
20 TABLET ORAL DAILY
Qty: 90 TABLET | Refills: 0 | Status: SHIPPED | OUTPATIENT
Start: 2025-07-23

## 2025-10-08 ENCOUNTER — APPOINTMENT (OUTPATIENT)
Dept: PRIMARY CARE | Facility: CLINIC | Age: 50
End: 2025-10-08
Payer: COMMERCIAL

## (undated) DEVICE — TUBING, SUCTION, 1/4" X 10', STRAIGHT: Brand: MEDLINE

## (undated) DEVICE — TRAP POLYP BALEEN

## (undated) DEVICE — SNARE ENDOSCP AD L240CM LOOP W10MM SHTH DIA2.4MM RND INSUL

## (undated) DEVICE — SINGLE PORT MANIFOLD: Brand: NEPTUNE 2

## (undated) DEVICE — TUBE SET 96 MM 64 MM H2O PERISTALTIC STD AUX CHANNEL

## (undated) DEVICE — Device: Brand: ENDO SMARTCAP

## (undated) DEVICE — BRUSH ENDO CLN L90.5IN SHTH DIA1.7MM BRIST DIA5-7MM 2-6MM